# Patient Record
Sex: FEMALE | Race: WHITE | NOT HISPANIC OR LATINO | URBAN - NONMETROPOLITAN AREA
[De-identification: names, ages, dates, MRNs, and addresses within clinical notes are randomized per-mention and may not be internally consistent; named-entity substitution may affect disease eponyms.]

---

## 2018-05-10 ENCOUNTER — APPOINTMENT (RX ONLY)
Dept: URBAN - NONMETROPOLITAN AREA CLINIC 13 | Facility: CLINIC | Age: 73
Setting detail: DERMATOLOGY
End: 2018-05-10

## 2018-05-10 VITALS — WEIGHT: 214 LBS | HEIGHT: 64 IN

## 2018-05-10 DIAGNOSIS — L20.89 OTHER ATOPIC DERMATITIS: ICD-10-CM

## 2018-05-10 PROBLEM — L30.9 DERMATITIS, UNSPECIFIED: Status: ACTIVE | Noted: 2018-05-10

## 2018-05-10 PROBLEM — E05.90 THYROTOXICOSIS, UNSPECIFIED WITHOUT THYROTOXIC CRISIS OR STORM: Status: ACTIVE | Noted: 2018-05-10

## 2018-05-10 PROBLEM — H91.90 UNSPECIFIED HEARING LOSS, UNSPECIFIED EAR: Status: ACTIVE | Noted: 2018-05-10

## 2018-05-10 PROBLEM — I48.91 UNSPECIFIED ATRIAL FIBRILLATION: Status: ACTIVE | Noted: 2018-05-10

## 2018-05-10 PROBLEM — L23.7 ALLERGIC CONTACT DERMATITIS DUE TO PLANTS, EXCEPT FOOD: Status: ACTIVE | Noted: 2018-05-10

## 2018-05-10 PROCEDURE — ? BIOPSY BY PUNCH METHOD

## 2018-05-10 PROCEDURE — 11100: CPT

## 2018-05-10 PROCEDURE — 99202 OFFICE O/P NEW SF 15 MIN: CPT | Mod: 25

## 2018-05-10 PROCEDURE — ? COUNSELING

## 2018-05-10 PROCEDURE — ? PRESCRIPTION

## 2018-05-10 PROCEDURE — ? ADDITIONAL NOTES

## 2018-05-10 RX ORDER — TRIAMCINOLONE ACETONIDE 1 MG/G
OINTMENT TOPICAL
Qty: 454 | Refills: 0 | Status: ERX | COMMUNITY
Start: 2018-05-10

## 2018-05-10 RX ADMIN — TRIAMCINOLONE ACETONIDE: 1 OINTMENT TOPICAL at 23:52

## 2018-05-10 ASSESSMENT — LOCATION SIMPLE DESCRIPTION DERM: LOCATION SIMPLE: LEFT UPPER ARM

## 2018-05-10 ASSESSMENT — LOCATION ZONE DERM: LOCATION ZONE: ARM

## 2018-05-10 ASSESSMENT — LOCATION DETAILED DESCRIPTION DERM: LOCATION DETAILED: LEFT ANTERIOR DISTAL UPPER ARM

## 2018-05-10 NOTE — PROCEDURE: BIOPSY BY PUNCH METHOD
Consent: Written consent was obtained and risks were reviewed including but not limited to scarring, infection, bleeding, scabbing, incomplete removal, nerve damage and allergy to anesthesia.
X Depth Of Punch In Cm (Optional): 0
Notification Instructions: Patient will be notified of biopsy results. However, patient instructed to call the office if not contacted within 2 weeks.
Path Notes (To The Dermatopathologist): Pool
Epidermal Sutures: 4-0 Plain Gut
Anesthesia Type: 1% lidocaine with epinephrine
Was A Bandage Applied: Yes
Dressing: bandage
Home Suture Removal Text: Patient was provided a home suture removal kit and will remove their sutures at home.  If they have any questions or difficulties they will call the office.
Lab Facility: [id number]
Hemostasis: None
Bill For Surgical Tray: no
Billing Type: Third-Party Bill
Detail Level: Detailed
Punch Size In Mm: 4
Anesthesia Volume In Cc: 0.5
Wound Care: Petrolatum
Lab: [id number]
Biopsy Type: H and E
Post-Care Instructions: I reviewed with the patient in detail post-care instructions. Patient is to keep the biopsy site dry overnight, and then apply bacitracin twice daily until healed. Patient may apply hydrogen peroxide soaks to remove any crusting.

## 2018-05-10 NOTE — PROCEDURE: ADDITIONAL NOTES
Additional Notes: PT PICKED UP BDC CLOBETASOL OINTMENT. WILL USE BID UNTIL SHE IS OUT OF IT AND THEN WILL SWITCH TO TAC.\\n\\nDISCUSSED THAT EXAM IS NOT SUGGESTIVE OF SCABIES.  IT DOESNT AFFECT WEBBED SPACES OF HANDS OR FEET, AREOLAS, UMBILICUS, OR GROIN.  NO BURROWS.  URTICARIAL AND ALMOST GEOGRAPHIC REALLY SUGGESTING A BRISK ALLERGIC CONTACT DERMATITIS WHICH CAN BE URTICARIAL.  HOWEVER WILL BIOPSY TODAY TO RULE OUT URTICARIA VS URTICARIAL BULLOUS PEMPHIGOID OR URTICARIAL VASCULITIS.

## 2018-05-10 NOTE — HPI: RASH
How Severe Is Your Rash?: moderate
Is This A New Presentation, Or A Follow-Up?: Rash
Additional History: THE INITIAL RASH STARTED ABOUT A WEEK OR TWO S/P CORTISONE INJECTION IN HER L KNEE. THE RASH WAS LOCALIZED TO THE L KNEE ARE FOR ABOUT 2 WEEKS, AND THEN SPREAD TO HER WHOLE BODY. SHE STATES THAT SHE HAS TRIED PUTTING BIOFREEZE, ICY HOT, AND LEMONGRASS OIL ON THE RASH ON HER KNEE. \\n\\nTHE RASH IS ITCHY, BUT DOES NOT KEEP HER UP AT NIGHT. THE RASH STAYS IN THE SAME PLACES. SHE DOES NOT HAVE ANY ITCHING ON HER HANDS, NAVEL, GROIN, OR TRUNK.  IS NOT EFFECTED BY THE RASH.  PT HAS NO HX OF ECZEMA, ALLERGIES, OR ASTHMA.\\n\\nPT SAW HER PCP A FEW DAYS AGO AND HE THOUGHT IT COULD POSSIBLY BE SCABIES, SO HE PRESCRIBED PERMETHRIN. SHE HAS USED ONCE. SHE HAS NOT BEEN GIVEN A TOPICAL STEROID TO USE ON THE RASH.

## 2018-07-11 ENCOUNTER — RX ONLY (OUTPATIENT)
Age: 73
Setting detail: RX ONLY
End: 2018-07-11

## 2018-07-11 RX ORDER — TRIAMCINOLONE ACETONIDE 1 MG/G
CREAM TOPICAL
Qty: 80 | Refills: 0 | Status: ERX | COMMUNITY
Start: 2018-07-11

## 2018-07-11 RX ORDER — HYDROXYZINE HYDROCHLORIDE 10 MG/1
TABLET, FILM COATED ORAL
Qty: 60 | Refills: 0 | Status: ERX | COMMUNITY
Start: 2018-07-11

## 2018-07-20 ENCOUNTER — APPOINTMENT (RX ONLY)
Dept: URBAN - NONMETROPOLITAN AREA CLINIC 13 | Facility: CLINIC | Age: 73
Setting detail: DERMATOLOGY
End: 2018-07-20

## 2018-07-20 DIAGNOSIS — L50.1 IDIOPATHIC URTICARIA: ICD-10-CM

## 2018-07-20 PROCEDURE — ? ADDITIONAL NOTES

## 2018-07-20 PROCEDURE — ? COUNSELING

## 2018-07-20 PROCEDURE — ? ORDER TESTS

## 2018-07-20 PROCEDURE — 99213 OFFICE O/P EST LOW 20 MIN: CPT

## 2018-07-20 ASSESSMENT — LOCATION SIMPLE DESCRIPTION DERM: LOCATION SIMPLE: ABDOMEN

## 2018-07-20 ASSESSMENT — LOCATION DETAILED DESCRIPTION DERM: LOCATION DETAILED: PERIUMBILICAL SKIN

## 2018-07-20 ASSESSMENT — LOCATION ZONE DERM: LOCATION ZONE: TRUNK
